# Patient Record
Sex: MALE | URBAN - METROPOLITAN AREA
[De-identification: names, ages, dates, MRNs, and addresses within clinical notes are randomized per-mention and may not be internally consistent; named-entity substitution may affect disease eponyms.]

---

## 2020-01-18 ENCOUNTER — HOSPITAL ENCOUNTER (INPATIENT)
Facility: MEDICAL CENTER | Age: 25
LOS: 2 days | DRG: 816 | End: 2020-01-20
Attending: EMERGENCY MEDICINE | Admitting: SURGERY
Payer: COMMERCIAL

## 2020-01-18 ENCOUNTER — APPOINTMENT (OUTPATIENT)
Dept: RADIOLOGY | Facility: MEDICAL CENTER | Age: 25
DRG: 816 | End: 2020-01-18
Attending: EMERGENCY MEDICINE
Payer: COMMERCIAL

## 2020-01-18 DIAGNOSIS — S36.029A SPLEEN HEMATOMA, INITIAL ENCOUNTER: ICD-10-CM
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
## 2020-01-18 PROBLEM — T14.90XA TRAUMA: Status: ACTIVE | Noted: 2020-01-18

## 2020-01-18 PROBLEM — Z53.09 CONTRAINDICATION TO DEEP VEIN THROMBOSIS (DVT) PROPHYLAXIS: Status: ACTIVE | Noted: 2020-01-18

## 2020-01-18 PROBLEM — S36.039A SPLEEN LACERATION: Status: ACTIVE | Noted: 2020-01-18

## 2020-01-18 PROBLEM — N28.1 RENAL CYST: Status: ACTIVE | Noted: 2020-01-18

## 2020-01-18 LAB
ABO GROUP BLD: NORMAL
ALBUMIN SERPL BCP-MCNC: 5.2 G/DL (ref 3.2–4.9)
ALBUMIN/GLOB SERPL: 1.5 G/DL
ALP SERPL-CCNC: 63 U/L (ref 30–99)
ALT SERPL-CCNC: 23 U/L (ref 2–50)
ANION GAP SERPL CALC-SCNC: 8 MMOL/L (ref 0–11.9)
APTT PPP: 27.2 SEC (ref 24.7–36)
AST SERPL-CCNC: 27 U/L (ref 12–45)
BILIRUB SERPL-MCNC: 1.5 MG/DL (ref 0.1–1.5)
BLD GP AB SCN SERPL QL: NORMAL
BUN SERPL-MCNC: 16 MG/DL (ref 8–22)
CALCIUM SERPL-MCNC: 10.2 MG/DL (ref 8.5–10.5)
CHLORIDE SERPL-SCNC: 105 MMOL/L (ref 96–112)
CO2 SERPL-SCNC: 25 MMOL/L (ref 20–33)
CREAT SERPL-MCNC: 1.17 MG/DL (ref 0.5–1.4)
ERYTHROCYTE [DISTWIDTH] IN BLOOD BY AUTOMATED COUNT: 35.6 FL (ref 35.9–50)
GLOBULIN SER CALC-MCNC: 3.4 G/DL (ref 1.9–3.5)
GLUCOSE SERPL-MCNC: 114 MG/DL (ref 65–99)
HCT VFR BLD AUTO: 41.5 % (ref 42–52)
HGB BLD-MCNC: 12.6 G/DL (ref 14–18)
HGB BLD-MCNC: 14 G/DL (ref 14–18)
INR PPP: 1.04 (ref 0.87–1.13)
LIPASE SERPL-CCNC: 15 U/L (ref 11–82)
MCH RBC QN AUTO: 28.6 PG (ref 27–33)
MCHC RBC AUTO-ENTMCNC: 33.7 G/DL (ref 33.7–35.3)
MCV RBC AUTO: 84.9 FL (ref 81.4–97.8)
PLATELET # BLD AUTO: 140 K/UL (ref 164–446)
PMV BLD AUTO: 10.9 FL (ref 9–12.9)
POTASSIUM SERPL-SCNC: 3.9 MMOL/L (ref 3.6–5.5)
PROT SERPL-MCNC: 8.6 G/DL (ref 6–8.2)
PROTHROMBIN TIME: 13.8 SEC (ref 12–14.6)
RBC # BLD AUTO: 4.89 M/UL (ref 4.7–6.1)
RH BLD: NORMAL
SODIUM SERPL-SCNC: 138 MMOL/L (ref 135–145)
WBC # BLD AUTO: 14.1 K/UL (ref 4.8–10.8)

## 2020-01-18 PROCEDURE — 86901 BLOOD TYPING SEROLOGIC RH(D): CPT

## 2020-01-18 PROCEDURE — 80053 COMPREHEN METABOLIC PANEL: CPT

## 2020-01-18 PROCEDURE — 86900 BLOOD TYPING SEROLOGIC ABO: CPT

## 2020-01-18 PROCEDURE — 83690 ASSAY OF LIPASE: CPT

## 2020-01-18 PROCEDURE — 85610 PROTHROMBIN TIME: CPT

## 2020-01-18 PROCEDURE — 99291 CRITICAL CARE FIRST HOUR: CPT

## 2020-01-18 PROCEDURE — 74177 CT ABD & PELVIS W/CONTRAST: CPT

## 2020-01-18 PROCEDURE — 86850 RBC ANTIBODY SCREEN: CPT

## 2020-01-18 PROCEDURE — 700105 HCHG RX REV CODE 258: Performed by: SURGERY

## 2020-01-18 PROCEDURE — 85730 THROMBOPLASTIN TIME PARTIAL: CPT

## 2020-01-18 PROCEDURE — 96374 THER/PROPH/DIAG INJ IV PUSH: CPT

## 2020-01-18 PROCEDURE — G0390 TRAUMA RESPONS W/HOSP CRITI: HCPCS

## 2020-01-18 PROCEDURE — 85018 HEMOGLOBIN: CPT

## 2020-01-18 PROCEDURE — 770022 HCHG ROOM/CARE - ICU (200)

## 2020-01-18 PROCEDURE — 36415 COLL VENOUS BLD VENIPUNCTURE: CPT

## 2020-01-18 PROCEDURE — 85027 COMPLETE CBC AUTOMATED: CPT

## 2020-01-18 PROCEDURE — A9270 NON-COVERED ITEM OR SERVICE: HCPCS | Performed by: NURSE PRACTITIONER

## 2020-01-18 PROCEDURE — 700111 HCHG RX REV CODE 636 W/ 250 OVERRIDE (IP): Performed by: NURSE PRACTITIONER

## 2020-01-18 PROCEDURE — 700117 HCHG RX CONTRAST REV CODE 255: Performed by: EMERGENCY MEDICINE

## 2020-01-18 PROCEDURE — 700102 HCHG RX REV CODE 250 W/ 637 OVERRIDE(OP): Performed by: NURSE PRACTITIONER

## 2020-01-18 RX ORDER — ONDANSETRON 2 MG/ML
4 INJECTION INTRAMUSCULAR; INTRAVENOUS EVERY 4 HOURS PRN
Status: DISCONTINUED | OUTPATIENT
Start: 2020-01-18 | End: 2020-01-20 | Stop reason: HOSPADM

## 2020-01-18 RX ORDER — OXYCODONE HYDROCHLORIDE 5 MG/1
5 TABLET ORAL
Status: DISCONTINUED | OUTPATIENT
Start: 2020-01-18 | End: 2020-01-20 | Stop reason: HOSPADM

## 2020-01-18 RX ORDER — ACETAMINOPHEN 650 MG/1
975 SUPPOSITORY RECTAL EVERY 6 HOURS
Status: DISCONTINUED | OUTPATIENT
Start: 2020-01-18 | End: 2020-01-18

## 2020-01-18 RX ORDER — DOCUSATE SODIUM 100 MG/1
100 CAPSULE, LIQUID FILLED ORAL 2 TIMES DAILY
Status: DISCONTINUED | OUTPATIENT
Start: 2020-01-18 | End: 2020-01-20 | Stop reason: HOSPADM

## 2020-01-18 RX ORDER — ACETAMINOPHEN 650 MG/1
650 SUPPOSITORY RECTAL EVERY 4 HOURS PRN
Status: DISCONTINUED | OUTPATIENT
Start: 2020-01-18 | End: 2020-01-20 | Stop reason: HOSPADM

## 2020-01-18 RX ORDER — POLYETHYLENE GLYCOL 3350 17 G/17G
1 POWDER, FOR SOLUTION ORAL 2 TIMES DAILY
Status: DISCONTINUED | OUTPATIENT
Start: 2020-01-18 | End: 2020-01-20 | Stop reason: HOSPADM

## 2020-01-18 RX ORDER — ENEMA 19; 7 G/133ML; G/133ML
1 ENEMA RECTAL
Status: DISCONTINUED | OUTPATIENT
Start: 2020-01-18 | End: 2020-01-20 | Stop reason: HOSPADM

## 2020-01-18 RX ORDER — BISACODYL 10 MG
10 SUPPOSITORY, RECTAL RECTAL
Status: DISCONTINUED | OUTPATIENT
Start: 2020-01-18 | End: 2020-01-20 | Stop reason: HOSPADM

## 2020-01-18 RX ORDER — AMOXICILLIN 250 MG
1 CAPSULE ORAL
Status: DISCONTINUED | OUTPATIENT
Start: 2020-01-18 | End: 2020-01-20 | Stop reason: HOSPADM

## 2020-01-18 RX ORDER — MORPHINE SULFATE 4 MG/ML
1-4 INJECTION, SOLUTION INTRAMUSCULAR; INTRAVENOUS
Status: DISCONTINUED | OUTPATIENT
Start: 2020-01-18 | End: 2020-01-20 | Stop reason: HOSPADM

## 2020-01-18 RX ORDER — FAMOTIDINE 20 MG/1
20 TABLET, FILM COATED ORAL 2 TIMES DAILY
Status: DISCONTINUED | OUTPATIENT
Start: 2020-01-18 | End: 2020-01-18

## 2020-01-18 RX ORDER — AMOXICILLIN 250 MG
1 CAPSULE ORAL NIGHTLY
Status: DISCONTINUED | OUTPATIENT
Start: 2020-01-18 | End: 2020-01-20 | Stop reason: HOSPADM

## 2020-01-18 RX ORDER — SODIUM CHLORIDE 9 MG/ML
INJECTION, SOLUTION INTRAVENOUS CONTINUOUS
Status: DISCONTINUED | OUTPATIENT
Start: 2020-01-18 | End: 2020-01-20

## 2020-01-18 RX ORDER — OXYCODONE HYDROCHLORIDE 10 MG/1
10 TABLET ORAL
Status: DISCONTINUED | OUTPATIENT
Start: 2020-01-18 | End: 2020-01-20 | Stop reason: HOSPADM

## 2020-01-18 RX ORDER — MORPHINE SULFATE 4 MG/ML
4 INJECTION, SOLUTION INTRAMUSCULAR; INTRAVENOUS
Status: DISCONTINUED | OUTPATIENT
Start: 2020-01-18 | End: 2020-01-18

## 2020-01-18 RX ORDER — ACETAMINOPHEN 325 MG/1
650 TABLET ORAL EVERY 4 HOURS PRN
Status: DISCONTINUED | OUTPATIENT
Start: 2020-01-18 | End: 2020-01-20 | Stop reason: HOSPADM

## 2020-01-18 RX ADMIN — FENTANYL CITRATE 50 MCG: 50 INJECTION, SOLUTION INTRAMUSCULAR; INTRAVENOUS at 18:02

## 2020-01-18 RX ADMIN — IOHEXOL 90 ML: 350 INJECTION, SOLUTION INTRAVENOUS at 16:57

## 2020-01-18 RX ADMIN — SODIUM CHLORIDE: 9 INJECTION, SOLUTION INTRAVENOUS at 19:07

## 2020-01-18 RX ADMIN — OXYCODONE HYDROCHLORIDE 5 MG: 5 TABLET ORAL at 20:17

## 2020-01-18 RX ADMIN — ACETAMINOPHEN 650 MG: 325 TABLET, FILM COATED ORAL at 19:06

## 2020-01-18 ASSESSMENT — LIFESTYLE VARIABLES
TOTAL SCORE: 1
TOTAL SCORE: 1
EVER_SMOKED: NEVER
CONSUMPTION TOTAL: NEGATIVE
ALCOHOL_USE: YES
HAVE YOU EVER FELT YOU SHOULD CUT DOWN ON YOUR DRINKING: YES
EVER_SMOKED: NEVER
TOTAL SCORE: 1
EVER FELT BAD OR GUILTY ABOUT YOUR DRINKING: NO
HAVE PEOPLE ANNOYED YOU BY CRITICIZING YOUR DRINKING: NO
DOES PATIENT WANT TO STOP DRINKING: NO
EVER HAD A DRINK FIRST THING IN THE MORNING TO STEADY YOUR NERVES TO GET RID OF A HANGOVER: NO
ON A TYPICAL DAY WHEN YOU DRINK ALCOHOL HOW MANY DRINKS DO YOU HAVE: 3
HOW MANY TIMES IN THE PAST YEAR HAVE YOU HAD 5 OR MORE DRINKS IN A DAY: 0
AVERAGE NUMBER OF DAYS PER WEEK YOU HAVE A DRINK CONTAINING ALCOHOL: 3

## 2020-01-18 ASSESSMENT — COPD QUESTIONNAIRES
DURING THE PAST 4 WEEKS HOW MUCH DID YOU FEEL SHORT OF BREATH: NONE/LITTLE OF THE TIME
HAVE YOU SMOKED AT LEAST 100 CIGARETTES IN YOUR ENTIRE LIFE: NO/DON'T KNOW
DO YOU EVER COUGH UP ANY MUCUS OR PHLEGM?: NO/ONLY WITH OCCASIONAL COLDS OR INFECTIONS
DO YOU EVER COUGH UP ANY MUCUS OR PHLEGM?: NO/ONLY WITH OCCASIONAL COLDS OR INFECTIONS
COPD SCREENING SCORE: 0
HAVE YOU SMOKED AT LEAST 100 CIGARETTES IN YOUR ENTIRE LIFE: NO/DON'T KNOW
IN THE PAST 12 MONTHS DO YOU DO LESS THAN YOU USED TO BECAUSE OF YOUR BREATHING PROBLEMS: DISAGREE/UNSURE
DURING THE PAST 4 WEEKS HOW MUCH DID YOU FEEL SHORT OF BREATH: NONE/LITTLE OF THE TIME

## 2020-01-18 ASSESSMENT — PATIENT HEALTH QUESTIONNAIRE - PHQ9
SUM OF ALL RESPONSES TO PHQ9 QUESTIONS 1 AND 2: 0
2. FEELING DOWN, DEPRESSED, IRRITABLE, OR HOPELESS: NOT AT ALL
1. LITTLE INTEREST OR PLEASURE IN DOING THINGS: NOT AT ALL

## 2020-01-19 LAB
ABO + RH BLD: NORMAL
ALBUMIN SERPL BCP-MCNC: 4 G/DL (ref 3.2–4.9)
ALBUMIN/GLOB SERPL: 1.4 G/DL
ALP SERPL-CCNC: 51 U/L (ref 30–99)
ALT SERPL-CCNC: 15 U/L (ref 2–50)
ANION GAP SERPL CALC-SCNC: 8 MMOL/L (ref 0–11.9)
AST SERPL-CCNC: 28 U/L (ref 12–45)
BASOPHILS # BLD AUTO: 0.2 % (ref 0–1.8)
BASOPHILS # BLD: 0.02 K/UL (ref 0–0.12)
BILIRUB SERPL-MCNC: 1.3 MG/DL (ref 0.1–1.5)
BUN SERPL-MCNC: 11 MG/DL (ref 8–22)
CALCIUM SERPL-MCNC: 9.2 MG/DL (ref 8.5–10.5)
CHLORIDE SERPL-SCNC: 107 MMOL/L (ref 96–112)
CO2 SERPL-SCNC: 25 MMOL/L (ref 20–33)
CREAT SERPL-MCNC: 0.83 MG/DL (ref 0.5–1.4)
EOSINOPHIL # BLD AUTO: 0.06 K/UL (ref 0–0.51)
EOSINOPHIL NFR BLD: 0.6 % (ref 0–6.9)
ERYTHROCYTE [DISTWIDTH] IN BLOOD BY AUTOMATED COUNT: 35.8 FL (ref 35.9–50)
GLOBULIN SER CALC-MCNC: 2.9 G/DL (ref 1.9–3.5)
GLUCOSE BLD-MCNC: 91 MG/DL (ref 65–99)
GLUCOSE SERPL-MCNC: 96 MG/DL (ref 65–99)
HCT VFR BLD AUTO: 36.4 % (ref 42–52)
HCT VFR BLD AUTO: 38.2 % (ref 42–52)
HCT VFR BLD AUTO: 38.4 % (ref 42–52)
HGB BLD-MCNC: 12.6 G/DL (ref 14–18)
HGB BLD-MCNC: 13 G/DL (ref 14–18)
HGB BLD-MCNC: 13.1 G/DL (ref 14–18)
IMM GRANULOCYTES # BLD AUTO: 0.02 K/UL (ref 0–0.11)
IMM GRANULOCYTES NFR BLD AUTO: 0.2 % (ref 0–0.9)
LYMPHOCYTES # BLD AUTO: 1.17 K/UL (ref 1–4.8)
LYMPHOCYTES NFR BLD: 12.6 % (ref 22–41)
MCH RBC QN AUTO: 29 PG (ref 27–33)
MCHC RBC AUTO-ENTMCNC: 34.6 G/DL (ref 33.7–35.3)
MCV RBC AUTO: 83.9 FL (ref 81.4–97.8)
MONOCYTES # BLD AUTO: 0.61 K/UL (ref 0–0.85)
MONOCYTES NFR BLD AUTO: 6.6 % (ref 0–13.4)
NEUTROPHILS # BLD AUTO: 7.43 K/UL (ref 1.82–7.42)
NEUTROPHILS NFR BLD: 79.8 % (ref 44–72)
NRBC # BLD AUTO: 0 K/UL
NRBC BLD-RTO: 0 /100 WBC
PLATELET # BLD AUTO: 151 K/UL (ref 164–446)
PMV BLD AUTO: 10.6 FL (ref 9–12.9)
POTASSIUM SERPL-SCNC: 3.4 MMOL/L (ref 3.6–5.5)
PROT SERPL-MCNC: 6.9 G/DL (ref 6–8.2)
RBC # BLD AUTO: 4.34 M/UL (ref 4.7–6.1)
SODIUM SERPL-SCNC: 140 MMOL/L (ref 135–145)
WBC # BLD AUTO: 9.3 K/UL (ref 4.8–10.8)

## 2020-01-19 PROCEDURE — A9270 NON-COVERED ITEM OR SERVICE: HCPCS | Performed by: NURSE PRACTITIONER

## 2020-01-19 PROCEDURE — 90686 IIV4 VACC NO PRSV 0.5 ML IM: CPT | Performed by: NURSE PRACTITIONER

## 2020-01-19 PROCEDURE — 85018 HEMOGLOBIN: CPT

## 2020-01-19 PROCEDURE — 770022 HCHG ROOM/CARE - ICU (200)

## 2020-01-19 PROCEDURE — 700102 HCHG RX REV CODE 250 W/ 637 OVERRIDE(OP): Performed by: SURGERY

## 2020-01-19 PROCEDURE — 85025 COMPLETE CBC W/AUTO DIFF WBC: CPT

## 2020-01-19 PROCEDURE — 700102 HCHG RX REV CODE 250 W/ 637 OVERRIDE(OP): Performed by: NURSE PRACTITIONER

## 2020-01-19 PROCEDURE — 700112 HCHG RX REV CODE 229: Performed by: NURSE PRACTITIONER

## 2020-01-19 PROCEDURE — 82962 GLUCOSE BLOOD TEST: CPT

## 2020-01-19 PROCEDURE — 99233 SBSQ HOSP IP/OBS HIGH 50: CPT | Performed by: SURGERY

## 2020-01-19 PROCEDURE — 700111 HCHG RX REV CODE 636 W/ 250 OVERRIDE (IP): Performed by: NURSE PRACTITIONER

## 2020-01-19 PROCEDURE — 85014 HEMATOCRIT: CPT

## 2020-01-19 PROCEDURE — 700105 HCHG RX REV CODE 258: Performed by: SURGERY

## 2020-01-19 PROCEDURE — 90471 IMMUNIZATION ADMIN: CPT

## 2020-01-19 PROCEDURE — 3E02340 INTRODUCTION OF INFLUENZA VACCINE INTO MUSCLE, PERCUTANEOUS APPROACH: ICD-10-PCS | Performed by: NURSE PRACTITIONER

## 2020-01-19 PROCEDURE — 80053 COMPREHEN METABOLIC PANEL: CPT

## 2020-01-19 PROCEDURE — A9270 NON-COVERED ITEM OR SERVICE: HCPCS | Performed by: SURGERY

## 2020-01-19 RX ORDER — POTASSIUM CHLORIDE 20 MEQ/1
40 TABLET, EXTENDED RELEASE ORAL ONCE
Status: COMPLETED | OUTPATIENT
Start: 2020-01-19 | End: 2020-01-19

## 2020-01-19 RX ADMIN — ACETAMINOPHEN 650 MG: 325 TABLET, FILM COATED ORAL at 15:04

## 2020-01-19 RX ADMIN — SODIUM CHLORIDE: 9 INJECTION, SOLUTION INTRAVENOUS at 05:07

## 2020-01-19 RX ADMIN — OXYCODONE HYDROCHLORIDE 10 MG: 10 TABLET ORAL at 19:58

## 2020-01-19 RX ADMIN — POTASSIUM CHLORIDE 40 MEQ: 1500 TABLET, EXTENDED RELEASE ORAL at 10:51

## 2020-01-19 RX ADMIN — ACETAMINOPHEN 650 MG: 325 TABLET, FILM COATED ORAL at 05:04

## 2020-01-19 RX ADMIN — SODIUM CHLORIDE: 9 INJECTION, SOLUTION INTRAVENOUS at 15:58

## 2020-01-19 RX ADMIN — ONDANSETRON 4 MG: 2 INJECTION INTRAMUSCULAR; INTRAVENOUS at 19:58

## 2020-01-19 RX ADMIN — INFLUENZA A VIRUS A/BRISBANE/02/2018 IVR-190 (H1N1) ANTIGEN (FORMALDEHYDE INACTIVATED), INFLUENZA A VIRUS A/KANSAS/14/2017 X-327 (H3N2) ANTIGEN (FORMALDEHYDE INACTIVATED), INFLUENZA B VIRUS B/PHUKET/3073/2013 ANTIGEN (FORMALDEHYDE INACTIVATED), AND INFLUENZA B VIRUS B/MARYLAND/15/2016 BX-69A ANTIGEN (FORMALDEHYDE INACTIVATED) 0.5 ML: 15; 15; 15; 15 INJECTION, SUSPENSION INTRAMUSCULAR at 10:51

## 2020-01-19 RX ADMIN — DOCUSATE SODIUM 100 MG: 100 CAPSULE, LIQUID FILLED ORAL at 05:04

## 2020-01-19 ASSESSMENT — COGNITIVE AND FUNCTIONAL STATUS - GENERAL
SUGGESTED CMS G CODE MODIFIER MOBILITY: CJ
MOBILITY SCORE: 21
SUGGESTED CMS G CODE MODIFIER DAILY ACTIVITY: CH
DAILY ACTIVITIY SCORE: 24
STANDING UP FROM CHAIR USING ARMS: A LITTLE
WALKING IN HOSPITAL ROOM: A LITTLE
CLIMB 3 TO 5 STEPS WITH RAILING: A LITTLE

## 2020-01-19 ASSESSMENT — ENCOUNTER SYMPTOMS
ROS GI COMMENTS: IMPROVING
ABDOMINAL DISTENTION: 1

## 2020-01-19 NOTE — H&P
TRAUMA HISTORY AND PHYSICAL    DATE OF SERVICE: 1/18/2020    ACTIVATION LEVEL: Green.     HISTORY OF PRESENT ILLNESS: The patient is a  24 year-old male who was injured after crashing while snowboarding. He did not sustain a loss of consciousness.  He was able to stand after the crash, but developed abdominal pain which prompted him to be evaluated in the ER.    The patient was triaged to [facility] [place] in accordance with established pre hospital protocols. An expeditious primary and secondary survey with required adjuncts was conducted. See Trauma Narrator for full details.    Upon my arrival, the patient is awake and complains of minimal abdominal pain.    PAST MEDICAL HISTORY: History reviewed. No pertinent past medical history.      PAST SURGICAL HISTORY: History reviewed. No pertinent surgical history.       ALLERGIES: Patient has no known allergies.       CURRENT MEDICATIONS:   Outpatient Medications Marked as Taking for the 1/18/20 encounter (Hospital Encounter)   Medication Sig   • Vitamin D-Vitamin K (VITAMIN K2-VITAMIN D3 PO) Take 1 Tab by mouth every morning.         FAMILY HISTORY:   Reviewed and found to be non-contributory in regards to the above presentation    SOCIAL HISTORY:  reports that he has never smoked. He has never used smokeless tobacco. He reports previous alcohol use. He reports previous drug use.  Patient denies habitual use of alcohol, tobacco, and illicit drugs    REVIEW OF SYSTEMS:   Review of Systems:  Constitutional: Negative for fever, chills, weight loss, malaise/fatigue and diaphoresis.   HENT: Negative for hearing loss, ear pain, nosebleeds, congestion, sore throat, neck pain, tinnitus and ear discharge.    Eyes: Negative for blurred vision, double vision, photophobia, pain, discharge and redness.   Respiratory: Negative for cough, hemoptysis, sputum production, shortness of breath, wheezing and stridor.    Cardiovascular: Negative for chest pain, palpitations,  "orthopnea, claudication, leg swelling and PND.   Gastrointestinal: Negative for heartburn, nausea, vomiting, abdominal pain, diarrhea, constipation, blood in stool and melena.   Genitourinary: Negative for dysuria, urgency, frequency, hematuria and flank pain.   Musculoskeletal: Negative for myalgias, back pain, joint pain and falls.   Skin: Negative for itching and rash.  Neurological: Negative for dizziness, tingling, tremors, sensory change, speech change, focal weakness, seizures, loss of consciousness, weakness and headaches.   Endo/Heme/Allergies: Negative for environmental allergies and polydipsia. Does not bruise/bleed easily.   Psychiatric/Behavioral: Negative for depression, suicidal ideas, hallucinations, memory loss and substance abuse. The patient is not nervous/anxious and does not have insomnia.    PHYSICAL EXAMINATION:   VITAL SIGNS:   · /78   Pulse 97   Temp 36.8 °C (98.2 °F) (Temporal)   Resp 18   Ht 1.803 m (5' 11\")   Wt 88.5 kg (195 lb)   SpO2 97%     GENERAL:   · The patient appears stated age, is in no apparent distress, is well developed and well nourished    HEENT:  · HEAD: Atraumatic, normocephalic.    · EARS: The ear canals and tympanic membranes are normal.Normal pinna bilaterally.    · EYES:  The pupils are equal, round, and reactive to light bilaterally. The extraocular muscles are intact bilaterally.    · NOSE: No rhinorrhea.  The bilateral nares are clear.  · THROAT: Oral mucosa is moist.  The oropharynx are clear.    FACE:   · The midface and jaw are stable. No malocclusion is evident.    NECK:    · The cervical spine was examined utilizing spinal motion restriction.   · No posterior midline cervical-spine tenderness, no evidence of intoxication, normal level of alertness (John Coma Scale 15), no focal neurologic deficit, and no painful distracting injuries..    CHEST:    · Inspection: Unlabored respirations, no intercostal retractions, paradoxical motion, or accessory " muscle use.  · Palpation:  The chest is nontender. The clavicles are non deformed bilaterally..  · Auscultation: clear to auscultation.    CARDIOVASCULAR:    · Auscultation: regular rate and rhythm.  · Peripheral Pulses: Normal.      ABDOMEN:    · Abdomen is soft, nontender, without organomegaly or masses.    BACK/PELVIS:    · The thoracolumbar spine was examined utilizing spinal motion restriction.   · Inspection and palpation reveal no significant tenderness, swelling, or deformity in the thoracolumbar region.  · The pelvis is stable.    RECTAL:  Deferred    GENITOURINARY:  The patient has normal external reproductive anatomy.    EXTREMITIES:  · RIGHT ARM: Without deformities, wounds, lacerations, or excoriations.  Full passive and active range of motion without pain.  · LEFT ARM: Without deformities, wounds, lacerations, or excoriations.  Full passive and active range of motion without pain.  · RIGHT LEG: Without deformities, wounds, lacerations, or excoriations.  Full passive and active range of motion without pain.  · LEFT LEG: Without deformities, wounds, lacerations, or excoriations.  Full passive and active range of motion without pain.    NEUROLOGIC:    · [place] Coma Scale (GCS) 15.   · Neurologic examination revealed no focal deficits noted, mental status intact, muscle tone normal, muscle strength normal, oriented for age x3.    SKIN:  · The skin is warm, dry and well purfused.    PSYCHIATRIC:   · The patient does not appear depressed or anxious.    LABORATORY VALUES:   Recent Labs     01/18/20  1626   WBC 14.1*   RBC 4.89   HEMOGLOBIN 14.0   HEMATOCRIT 41.5*   MCV 84.9   MCH 28.6   MCHC 33.7   RDW 35.6*   PLATELETCT 140*   MPV 10.9     Recent Labs     01/18/20  1626   SODIUM 138   POTASSIUM 3.9   CHLORIDE 105   CO2 25   GLUCOSE 114*   BUN 16   CREATININE 1.17   CALCIUM 10.2     Recent Labs     01/18/20  1626   ASTSGOT 27   ALTSGPT 23   TBILIRUBIN 1.5   ALKPHOSPHAT 63   GLOBULIN 3.4   INR 1.04     Recent  Labs     01/18/20  1626   APTT 27.2   INR 1.04        IMAGING:   CT-CHEST,ABDOMEN,PELVIS WITH   Final Result      1.  Moderate sized perisplenic hematoma      2.  Presence of a perisplenic hematoma involving greater than 50% of the surface is a grade 3 splenic injury      3.  No other acute finding      4.  Incidental left renal cyst      Findings were discussed with Dr. Pan on 1/18/2020 5:10 PM.      No follow up imaging is recommended per consensus guidelines of the 2019 ACR Incidental Findings Committee for probably benign incidental simple appearing renal cystic lesion(s) based on imaging criteria.          IMPRESSION AND PLAN:   Spleen laceration  Moderate sized perisplenic hematoma  Perisplenic hematoma involving greater than 50% of the surface is a grade 3 splenic injury.  Bedrest times 24 hours  Serial hemogram and abdominal exams    Renal cyst  Incidental findings.  Follow up with PCP outpatient    Trauma  Snowboarding crash. (-) LOC  Trauma Green Activation.  Anderson Romero MD. Trauma Surgery.    Contraindication to deep vein thrombosis (DVT) prophylaxis  Initial systemic anticoagulation contraindicated secondary to elevated bleeding risk.   Consider surveillance venous duplex scanning if unable to initiate prophylactic Lovenox within 48 hrs of admission.      DISPOSITION:  Trauma ICU.    I independently reviewed pertinent clinical lab tests since arrival and ordered additional follow up clinical lab tests.  I independently reviewed pertinent radiographic images and the radiologist's reports since arrival and ordered additional follow up radiographic studies.  I reviewed the details of the available patient records and summated the information, and utilized the information as warranted in today's medical decision making for this patient.    High grade blunt splenic injury with risk of life threatening hemorrhage requiring integration of multiple laboratory, radiographic, and hemodynamic data points and  associated complex medical decision making involving high complexity decision making initiated in an urgent manner by assessing, manipulating, and supporting circulatory function and cardiac function given the high probability of further deterioration in the patient's condition and threat to life.    Aggregated critical care time spent evaluating, reviewing documentation, providing care, and managing this patient exclusive of procedures: 45 minutes  ____________________________________   LUIS DANIEL Feliciano     DD: 1/18/2020   DT: 7:28 PM

## 2020-01-19 NOTE — PROGRESS NOTES
"TRAUMA TERTIARY SURVEY     Mental status adequate for full examination?: Yes    Spine cleared (radiologically and/or clinically): Yes    PHYSICAL EXAMINATION:  Vitals: /66   Pulse 70   Temp 37.1 °C (98.8 °F) (Temporal)   Resp 17   Ht 1.803 m (5' 11\")   Wt 91.3 kg (201 lb 4.5 oz)   SpO2 98%   BMI 28.07 kg/m²   Constitutional:     General Appearance: appears stated age.  HEENT:     No significant external craniofacial trauma. The pupils are equal, round, and reactive to light bilaterally. The extraocular muscles are intact bilaterally. The nares and oropharynx are clear. The midface and jaw are stable. No malocclusion is evident.  Neck:    Normal range of motion.  Respiratory:   Inspection: Unlabored respirations, no intercostal retractions, paradoxical motion, or accessory muscle use.   Palpation:  The chest is nontender. The clavicles are non deformed bilaterally..   Auscultation: clear to auscultation.  Cardiovascular:   Auscultation: normal.   Peripheral Pulses: Normal.   Abdomen:   Abdomen tender with palpation. L>R upper quadrants and epigastric region  Genitourinary:   (MALE): Not observed   Musculoskeletal:   The pelvis is stable.  No significant angulation, deformity, or soft tissue injury involving the upper and lower extremities. Normal range of motion.   Back:   The thoracolumbar spine was examined. Examination is remarkable for no significant tenderness, swelling, or deformity in the thoracolumbar region.  Skin:   The skin is warm and dry.  Neurologic:    John Coma Scale (GCS) 15 [id number]. Neurologic examination revealed oriented for age x3.  Psychiatric:   The patient does not appear depressed or anxious.    IMAGING:  CT-CHEST,ABDOMEN,PELVIS WITH   Final Result      1.  Moderate sized perisplenic hematoma      2.  Presence of a perisplenic hematoma involving greater than 50% of the surface is a grade 3 splenic injury      3.  No other acute finding      4.  Incidental left renal cyst    "   Findings were discussed with Dr. Pan on 1/18/2020 5:10 PM.      No follow up imaging is recommended per consensus guidelines of the 2019 ACR Incidental Findings Committee for probably benign incidental simple appearing renal cystic lesion(s) based on imaging criteria.        All current laboratory studies/radiology exams reviewed: Yes    Completed Consultations:  None     Pending Consultations:  None    Newly Identified Diagnoses and Injuries:  Non3    TOTAL RAP SCORE:  RAP Score Total: 2      ETOH Screening  CAGE Score: 1  Assessment complete date: 1/19/2020

## 2020-01-19 NOTE — ED NOTES
Pharmacy Medication Reconciliation      Medication reconciliation updated and complete per pt at bedside  Allergies have been verified   No oral ABX within the last 14 days

## 2020-01-19 NOTE — ED NOTES
Trauma green: pt bib ems, snowboarding fell, denies loc. C/o mid abdominal pain. Pt was not wearing helmet. gcs=15

## 2020-01-19 NOTE — CARE PLAN
Problem: Infection  Goal: Will remain free from infection  Outcome: PROGRESSING AS EXPECTED  Intervention: Assess signs and symptoms of infection  Note:   Interventions in place. Education on infection prevention provided. Lines assessed for necessity     Problem: Venous Thromboembolism (VTW)/Deep Vein Thrombosis (DVT) Prevention:  Goal: Patient will participate in Venous Thrombosis (VTE)/Deep Vein Thrombosis (DVT)Prevention Measures  Outcome: PROGRESSING AS EXPECTED  Intervention: Ensure patient wears graduated elastic stockings (HENRI hose) and/or SCDs, if ordered, when in bed or chair (Remove at least once per shift for skin check)  Note:   SCD's on lower legs. Pt educated on need for use of SCD's. No anticoagulation at this time per MD

## 2020-01-19 NOTE — PROGRESS NOTES
1900 Pt arrival to unit, S 124. Pt self transferred to ICU bed. CHG bath provided. Pt attached to in room monitor. 2 RN skin assessment performed with Quinn HUSAIN. Pts skin appears intact. Full assessment performed by this RN. Pt AxOx4, complains of pain to abdomen. Provided with call light. Bed locked and alarm on.

## 2020-01-19 NOTE — ASSESSMENT & PLAN NOTE
Initial systemic anticoagulation contraindicated secondary to elevated bleeding risk.   Consider surveillance venous duplex scanning if unable to initiate prophylactic Lovenox within 48 hrs of admission.

## 2020-01-19 NOTE — ED PROVIDER NOTES
ED Provider Note    CHIEF COMPLAINT  Chief Complaint   Patient presents with   • Trauma Green       [facility]  Isela Tito is a 24 y.o. male who presents to the emergency department complaining of abdominal pain after snowboarding accident.  Patient was snowboarding downhill and caught his toe side edge, he then tumbled multiple times.  Did not have any initial injury.  Later said having some abdominal pain and feeling lightheaded.  She went to the clinic and work-up was felt to be pale and ill-appearing.  EMS was called he was sent here.  Diverted from a local hospital.  Planes of diffuse abdominal pain and some chest discomfort he takes a deep breath.  Denies any nausea vomiting other acute medical problems or complaints.  Was not wearing helmet did not hit his head.  No headache or neck pain.  No musculoskeletal injury to his arms or legs.  No back pain.    REVIEW OF SYSTEMS  See HPI for further details. All other systems are negative.    PAST MEDICAL HISTORY  History reviewed. No pertinent past medical history.    FAMILY HISTORY  History reviewed. No pertinent family history.    SOCIAL HISTORY  Social History     Socioeconomic History   • Marital status: Not on file     Spouse name: Not on file   • Number of children: Not on file   • Years of education: Not on file   • Highest education level: Not on file   Occupational History   • Not on file   Social Needs   • Financial resource strain: Not on file   • Food insecurity:     Worry: Not on file     Inability: Not on file   • Transportation needs:     Medical: Not on file     Non-medical: Not on file   Tobacco Use   • Smoking status: Never Smoker   • Smokeless tobacco: Never Used   Substance and Sexual Activity   • Alcohol use: Not Currently   • Drug use: Not Currently   • Sexual activity: Not on file   Lifestyle   • Physical activity:     Days per week: Not on file     Minutes per session: Not on file   • Stress: Not on file   Relationships   • Social connections:      "Talks on phone: Not on file     Gets together: Not on file     Attends Protestant service: Not on file     Active member of club or organization: Not on file     Attends meetings of clubs or organizations: Not on file     Relationship status: Not on file   • Intimate partner violence:     Fear of current or ex partner: Not on file     Emotionally abused: Not on file     Physically abused: Not on file     Forced sexual activity: Not on file   Other Topics Concern   • Not on file   Social History Narrative   • Not on file       SURGICAL HISTORY  History reviewed. No pertinent surgical history.    CURRENT MEDICATIONS  Home Medications     Reviewed by Carie Sanabria R.N. (Registered Nurse) on 01/18/20 at 1648  Med List Status: Complete   Medication Last Dose Status        Patient Arnulfo Taking any Medications                       ALLERGIES  No Known Allergies    PHYSICAL EXAM  VITAL SIGNS: /78   Pulse 97   Temp 36.8 °C (98.2 °F) (Temporal)   Resp 18   Ht 1.803 m (5' 11\")   Wt 88.5 kg (195 lb)   SpO2 97%   BMI 27.20 kg/m²    Constitutional: Well developed, Well nourished, No acute distress, Non-toxic appearance.   HENT: Normocephalic, Atraumatic, Bilateral external ears normal, Oropharynx moist, No oral exudates, Nose normal.   Eyes: PERRL, EOMI, Conjunctiva normal, No discharge.   Neck: Normal range of motion, No tenderness, Supple, No stridor.  Neck is clearable by Nexus criteria  Cardiovascular: Normal heart rate, Normal rhythm, No murmurs, No rubs,   Thorax & Lungs: Normal breath sounds, No respiratory distress, No wheezing, No chest tenderness.   Abdomen: Bowel sounds normal, Soft, diffusely tender without peritonitis most tender left upper quadrant.  skin: Warm, Dry, No erythema, No rash.   Back: No tenderness, No CVA tenderness.   Musculoskeletal: Good range of motion in all major joints.  Neurologic: Alert & oriented x 3, No focal deficits noted.   Psychiatric: Affect normal    Results for orders " placed or performed during the hospital encounter of 01/18/20   CBC WITHOUT DIFFERENTIAL   Result Value Ref Range    WBC 14.1 (H) 4.8 - 10.8 K/uL    RBC 4.89 4.70 - 6.10 M/uL    Hemoglobin 14.0 14.0 - 18.0 g/dL    Hematocrit 41.5 (L) 42.0 - 52.0 %    MCV 84.9 81.4 - 97.8 fL    MCH 28.6 27.0 - 33.0 pg    MCHC 33.7 33.7 - 35.3 g/dL    RDW 35.6 (L) 35.9 - 50.0 fL    Platelet Count 140 (L) 164 - 446 K/uL    MPV 10.9 9.0 - 12.9 fL   Comp Metabolic Panel   Result Value Ref Range    Sodium 138 135 - 145 mmol/L    Potassium 3.9 3.6 - 5.5 mmol/L    Chloride 105 96 - 112 mmol/L    Co2 25 20 - 33 mmol/L    Anion Gap 8.0 0.0 - 11.9    Glucose 114 (H) 65 - 99 mg/dL    Bun 16 8 - 22 mg/dL    Creatinine 1.17 0.50 - 1.40 mg/dL    Calcium 10.2 8.5 - 10.5 mg/dL    AST(SGOT) 27 12 - 45 U/L    ALT(SGPT) 23 2 - 50 U/L    Alkaline Phosphatase 63 30 - 99 U/L    Total Bilirubin 1.5 0.1 - 1.5 mg/dL    Albumin 5.2 (H) 3.2 - 4.9 g/dL    Total Protein 8.6 (H) 6.0 - 8.2 g/dL    Globulin 3.4 1.9 - 3.5 g/dL    A-G Ratio 1.5 g/dL   LIPASE   Result Value Ref Range    Lipase 15 11 - 82 U/L   APTT   Result Value Ref Range    APTT 27.2 24.7 - 36.0 sec   PROTHROMBIN TIME (INR)   Result Value Ref Range    PT 13.8 12.0 - 14.6 sec    INR 1.04 0.87 - 1.13   ESTIMATED GFR   Result Value Ref Range    GFR If African American >60 >60 mL/min/1.73 m 2    GFR If Non African American >60 >60 mL/min/1.73 m 2        RADIOLOGY/PROCEDURES  CT-CHEST,ABDOMEN,PELVIS WITH   Final Result      1.  Moderate sized perisplenic hematoma      2.  Presence of a perisplenic hematoma involving greater than 50% of the surface is a grade 3 splenic injury      3.  No other acute finding      4.  Incidental left renal cyst      Findings were discussed with Dr. Pan on 1/18/2020 5:10 PM.      No follow up imaging is recommended per consensus guidelines of the 2019 ACR Incidental Findings Committee for probably benign incidental simple appearing renal cystic lesion(s) based on imaging  criteria.            COURSE & MEDICAL DECISION MAKING  Pertinent Labs & Imaging studies reviewed. (See chart for details)    Patient presents transferred here via EMS for abdominal pain after trauma.  He fell but denied his head.  He did have a tumble.  He was not knocked out.  He said  Clinically clear.  No signs of head injury.  Chest is nontender wrist and chest.  Takes a deep breath.  Also some abdominal tenderness.  Patient an episode of lightheadedness and diaphoresis.    The patient require hospitalization for further work-up and treatment.  Case discussed with the trauma surgery service.  Patient has no other apparent injuries.  Other injury to his back or extremities.    Labs been obtained and reviewed.  The patient will be hospitalized with trauma surgery service.  Case discussed with Dr. Pan  and care transferred at that time      FINAL IMPRESSION  1. Spleen hematoma, initial encounter     2. Snowboard accident, initial encounter         2.   3.         Electronically signed by: Mendel Petty M.D., 1/18/2020 5:55 PM

## 2020-01-19 NOTE — PROGRESS NOTES
"Trauma Progress Note 1/19/2020 4:47 AM    This is a 24 y.o. male who crashed while snowboarding. He sustained grade 3 spleen laceration. His hemoglobin has been stable through the night.    Plan:   - continue hemoglobin monitoring  - start diet this AM   - continue bedrest until this evening     Assessment: awake, alert, pain is controlled    /68   Pulse 78   Temp 36.9 °C (98.4 °F) (Temporal)   Resp 19   Ht 1.803 m (5' 11\")   Wt 91.1 kg (200 lb 13.4 oz)   SpO2 98%   BMI 28.01 kg/m²     Hemoglobin: 12.6 g/dL  Hematocrit: 36.4 %    Urine Output: voiding / adequate output     Recent Labs     01/18/20  1626   APTT 27.2   INR 1.04      Spleen laceration- (present on admission)  Assessment & Plan  Moderate sized perisplenic hematoma  Perisplenic hematoma involving greater than 50% of the surface is a grade 3 splenic injury.  Bedrest times 24 hours  Serial hemogram and abdominal exams    Contraindication to deep vein thrombosis (DVT) prophylaxis- (present on admission)  Assessment & Plan  Initial systemic anticoagulation contraindicated secondary to elevated bleeding risk.   Consider surveillance venous duplex scanning if unable to initiate prophylactic Lovenox within 48 hrs of admission.    Trauma- (present on admission)  Assessment & Plan  Snowboarding crash. (-) LOC  Trauma Green Activation.  Anderson Romero MD. Trauma Surgery.    Renal cyst- (present on admission)  Assessment & Plan  Incidental findings.  Follow up with PCP outpatient        "

## 2020-01-19 NOTE — CARE PLAN
Problem: Knowledge Deficit  Goal: Knowledge of disease process/condition, treatment plan, diagnostic tests, and medications will improve  Note:   Assessment of knowledge of disease process and discussion regarding plan of care and goals of care.      Problem: Pain Management  Goal: Pain level will decrease to patient's comfort goal  Note:   Pharmacological and nonpharmacological interventions in place to help manage pain

## 2020-01-19 NOTE — PROGRESS NOTES
Trauma / Surgical Daily Progress Note    Date of Service  1/19/2020    Chief Complaint  24 y.o. male admitted 1/18/2020 with splenic laceration after a fall while snowboarding    Interval Events  Hospital day #2  Pt currently requires ICU care and hospital admission  Seen on rounds and discussed with multidisciplinary team  Physiologic derangements preclude floor transfer  Events and interventions include:  Integration of multiple data points and associated complex medical decisions   Pain control  Pulmonary toilet  Serial hgbs and monitoring of hemodynamic status    Review of Systems  Review of Systems   Gastrointestinal: Positive for abdominal distention.        Improving   All other systems reviewed and are negative.       Vital Signs for last 24 hours  Temp:  [36.7 °C (98 °F)-37.3 °C (99.1 °F)] 37.1 °C (98.8 °F)  Pulse:  [] 100  Resp:  [16-20] 20  BP: (101-135)/(55-83) 124/68  SpO2:  [94 %-100 %] 100 %    Hemodynamic parameters for last 24 hours       Respiratory Data     Respiration: 20, Pulse Oximetry: 100 %, O2 Daily Delivery Respiratory : Room Air with O2 Available        RUL Breath Sounds: Clear, RML Breath Sounds: Clear, RLL Breath Sounds: Clear, MEGAN Breath Sounds: Clear, LLL Breath Sounds: Clear    Physical Exam  Physical Exam  Constitutional:       General: He is not in acute distress.     Appearance: He is not ill-appearing.   HENT:      Head: Normocephalic and atraumatic.      Right Ear: External ear normal.      Left Ear: External ear normal.      Mouth/Throat:      Mouth: Mucous membranes are moist.   Eyes:      Extraocular Movements: Extraocular movements intact.      Pupils: Pupils are equal, round, and reactive to light.   Neck:      Musculoskeletal: Normal range of motion.   Cardiovascular:      Rate and Rhythm: Normal rate and regular rhythm.      Pulses: Normal pulses.      Heart sounds: Normal heart sounds.   Pulmonary:      Effort: Pulmonary effort is normal.      Breath sounds: Normal  breath sounds.   Abdominal:      General: Abdomen is flat.      Tenderness: There is no tenderness. There is no guarding.   Musculoskeletal: Normal range of motion.   Skin:     General: Skin is warm and dry.   Neurological:      General: No focal deficit present.      Mental Status: He is alert and oriented to person, place, and time.      Cranial Nerves: No cranial nerve deficit.   Psychiatric:         Mood and Affect: Mood normal.         Thought Content: Thought content normal.         Judgment: Judgment normal.         Laboratory  Recent Results (from the past 24 hour(s))   CBC WITHOUT DIFFERENTIAL    Collection Time: 01/18/20  4:26 PM   Result Value Ref Range    WBC 14.1 (H) 4.8 - 10.8 K/uL    RBC 4.89 4.70 - 6.10 M/uL    Hemoglobin 14.0 14.0 - 18.0 g/dL    Hematocrit 41.5 (L) 42.0 - 52.0 %    MCV 84.9 81.4 - 97.8 fL    MCH 28.6 27.0 - 33.0 pg    MCHC 33.7 33.7 - 35.3 g/dL    RDW 35.6 (L) 35.9 - 50.0 fL    Platelet Count 140 (L) 164 - 446 K/uL    MPV 10.9 9.0 - 12.9 fL   Comp Metabolic Panel    Collection Time: 01/18/20  4:26 PM   Result Value Ref Range    Sodium 138 135 - 145 mmol/L    Potassium 3.9 3.6 - 5.5 mmol/L    Chloride 105 96 - 112 mmol/L    Co2 25 20 - 33 mmol/L    Anion Gap 8.0 0.0 - 11.9    Glucose 114 (H) 65 - 99 mg/dL    Bun 16 8 - 22 mg/dL    Creatinine 1.17 0.50 - 1.40 mg/dL    Calcium 10.2 8.5 - 10.5 mg/dL    AST(SGOT) 27 12 - 45 U/L    ALT(SGPT) 23 2 - 50 U/L    Alkaline Phosphatase 63 30 - 99 U/L    Total Bilirubin 1.5 0.1 - 1.5 mg/dL    Albumin 5.2 (H) 3.2 - 4.9 g/dL    Total Protein 8.6 (H) 6.0 - 8.2 g/dL    Globulin 3.4 1.9 - 3.5 g/dL    A-G Ratio 1.5 g/dL   LIPASE    Collection Time: 01/18/20  4:26 PM   Result Value Ref Range    Lipase 15 11 - 82 U/L   APTT    Collection Time: 01/18/20  4:26 PM   Result Value Ref Range    APTT 27.2 24.7 - 36.0 sec   PROTHROMBIN TIME (INR)    Collection Time: 01/18/20  4:26 PM   Result Value Ref Range    PT 13.8 12.0 - 14.6 sec    INR 1.04 0.87 - 1.13    ESTIMATED GFR    Collection Time: 01/18/20  4:26 PM   Result Value Ref Range    GFR If African American >60 >60 mL/min/1.73 m 2    GFR If Non African American >60 >60 mL/min/1.73 m 2   COD (ADULT)    Collection Time: 01/18/20  5:22 PM   Result Value Ref Range    ABO Grouping Only O     Rh Grouping Only POS     Antibody Screen-Cod NEG    HGB    Collection Time: 01/18/20 10:35 PM   Result Value Ref Range    Hemoglobin 12.6 (L) 14.0 - 18.0 g/dL   CBC WITH DIFFERENTIAL    Collection Time: 01/19/20  4:42 AM   Result Value Ref Range    WBC 9.3 4.8 - 10.8 K/uL    RBC 4.34 (L) 4.70 - 6.10 M/uL    Hemoglobin 12.6 (L) 14.0 - 18.0 g/dL    Hematocrit 36.4 (L) 42.0 - 52.0 %    MCV 83.9 81.4 - 97.8 fL    MCH 29.0 27.0 - 33.0 pg    MCHC 34.6 33.7 - 35.3 g/dL    RDW 35.8 (L) 35.9 - 50.0 fL    Platelet Count 151 (L) 164 - 446 K/uL    MPV 10.6 9.0 - 12.9 fL    Neutrophils-Polys 79.80 (H) 44.00 - 72.00 %    Lymphocytes 12.60 (L) 22.00 - 41.00 %    Monocytes 6.60 0.00 - 13.40 %    Eosinophils 0.60 0.00 - 6.90 %    Basophils 0.20 0.00 - 1.80 %    Immature Granulocytes 0.20 0.00 - 0.90 %    Nucleated RBC 0.00 /100 WBC    Neutrophils (Absolute) 7.43 (H) 1.82 - 7.42 K/uL    Lymphs (Absolute) 1.17 1.00 - 4.80 K/uL    Monos (Absolute) 0.61 0.00 - 0.85 K/uL    Eos (Absolute) 0.06 0.00 - 0.51 K/uL    Baso (Absolute) 0.02 0.00 - 0.12 K/uL    Immature Granulocytes (abs) 0.02 0.00 - 0.11 K/uL    NRBC (Absolute) 0.00 K/uL   Comp Metabolic Panel    Collection Time: 01/19/20  4:42 AM   Result Value Ref Range    Sodium 140 135 - 145 mmol/L    Potassium 3.4 (L) 3.6 - 5.5 mmol/L    Chloride 107 96 - 112 mmol/L    Co2 25 20 - 33 mmol/L    Anion Gap 8.0 0.0 - 11.9    Glucose 96 65 - 99 mg/dL    Bun 11 8 - 22 mg/dL    Creatinine 0.83 0.50 - 1.40 mg/dL    Calcium 9.2 8.5 - 10.5 mg/dL    AST(SGOT) 28 12 - 45 U/L    ALT(SGPT) 15 2 - 50 U/L    Alkaline Phosphatase 51 30 - 99 U/L    Total Bilirubin 1.3 0.1 - 1.5 mg/dL    Albumin 4.0 3.2 - 4.9 g/dL    Total  Protein 6.9 6.0 - 8.2 g/dL    Globulin 2.9 1.9 - 3.5 g/dL    A-G Ratio 1.4 g/dL   ACCU-CHEK GLUCOSE    Collection Time: 01/19/20  4:42 AM   Result Value Ref Range    Glucose - Accu-Ck 91 65 - 99 mg/dL   ESTIMATED GFR    Collection Time: 01/19/20  4:42 AM   Result Value Ref Range    GFR If African American >60 >60 mL/min/1.73 m 2    GFR If Non African American >60 >60 mL/min/1.73 m 2   HEMOGLOBIN AND HEMATOCRIT    Collection Time: 01/19/20 11:01 AM   Result Value Ref Range    Hemoglobin 13.1 (L) 14.0 - 18.0 g/dL    Hematocrit 38.4 (L) 42.0 - 52.0 %       Fluids    Intake/Output Summary (Last 24 hours) at 1/19/2020 1214  Last data filed at 1/19/2020 1000  Gross per 24 hour   Intake 1600 ml   Output 1520 ml   Net 80 ml       Core Measures & Quality Metrics  Labs reviewed, Medications reviewed and Radiology images reviewed  Penaloza catheter: No Penaloza      DVT Prophylaxis: Contraindicated - High bleeding risk    Ulcer prophylaxis: Not indicated        RADHA Score  ETOH Screening    Assessment/Plan  Spleen laceration- (present on admission)  Assessment & Plan  Moderate sized perisplenic hematoma  Perisplenic hematoma involving greater than 50% of the surface is a grade 3 splenic injury.  Bedrest times 24 hours.  Serial hemogram and abdominal exams    Contraindication to deep vein thrombosis (DVT) prophylaxis- (present on admission)  Assessment & Plan  Initial systemic anticoagulation contraindicated secondary to elevated bleeding risk.   Consider surveillance venous duplex scanning if unable to initiate prophylactic Lovenox within 48 hrs of admission.    Trauma- (present on admission)  Assessment & Plan  Snowboarding crash. (-) LOC  Trauma Green Activation.  Anderson Romero MD. Trauma Surgery.    Renal cyst- (present on admission)  Assessment & Plan  Incidental findings.  Follow up with PCP outpatient        Discussed patient condition with RN, RT, Pharmacy and Patient.

## 2020-01-19 NOTE — ASSESSMENT & PLAN NOTE
Moderate sized perisplenic hematoma  Perisplenic hematoma involving greater than 50% of the surface is a grade 3 splenic injury.  Bedrest times 24 hours.  Serial hemogram and abdominal exams

## 2020-01-20 VITALS
HEIGHT: 71 IN | BODY MASS INDEX: 29.07 KG/M2 | OXYGEN SATURATION: 97 % | RESPIRATION RATE: 18 BRPM | SYSTOLIC BLOOD PRESSURE: 132 MMHG | TEMPERATURE: 99.1 F | DIASTOLIC BLOOD PRESSURE: 73 MMHG | WEIGHT: 207.67 LBS | HEART RATE: 104 BPM

## 2020-01-20 LAB
ALBUMIN SERPL BCP-MCNC: 3.9 G/DL (ref 3.2–4.9)
ALBUMIN/GLOB SERPL: 1.3 G/DL
ALP SERPL-CCNC: 54 U/L (ref 30–99)
ALT SERPL-CCNC: 18 U/L (ref 2–50)
ANION GAP SERPL CALC-SCNC: 10 MMOL/L (ref 0–11.9)
AST SERPL-CCNC: 29 U/L (ref 12–45)
BASOPHILS # BLD AUTO: 0.2 % (ref 0–1.8)
BASOPHILS # BLD: 0.02 K/UL (ref 0–0.12)
BILIRUB SERPL-MCNC: 1.2 MG/DL (ref 0.1–1.5)
BUN SERPL-MCNC: 7 MG/DL (ref 8–22)
CALCIUM SERPL-MCNC: 9.1 MG/DL (ref 8.5–10.5)
CHLORIDE SERPL-SCNC: 105 MMOL/L (ref 96–112)
CO2 SERPL-SCNC: 24 MMOL/L (ref 20–33)
CREAT SERPL-MCNC: 0.89 MG/DL (ref 0.5–1.4)
EOSINOPHIL # BLD AUTO: 0.15 K/UL (ref 0–0.51)
EOSINOPHIL NFR BLD: 1.4 % (ref 0–6.9)
ERYTHROCYTE [DISTWIDTH] IN BLOOD BY AUTOMATED COUNT: 36.1 FL (ref 35.9–50)
GLOBULIN SER CALC-MCNC: 3.1 G/DL (ref 1.9–3.5)
GLUCOSE SERPL-MCNC: 94 MG/DL (ref 65–99)
HCT VFR BLD AUTO: 37.5 % (ref 42–52)
HCT VFR BLD AUTO: 38.7 % (ref 42–52)
HGB BLD-MCNC: 13 G/DL (ref 14–18)
HGB BLD-MCNC: 13.2 G/DL (ref 14–18)
IMM GRANULOCYTES # BLD AUTO: 0.06 K/UL (ref 0–0.11)
IMM GRANULOCYTES NFR BLD AUTO: 0.6 % (ref 0–0.9)
LYMPHOCYTES # BLD AUTO: 1.4 K/UL (ref 1–4.8)
LYMPHOCYTES NFR BLD: 12.9 % (ref 22–41)
MCH RBC QN AUTO: 29.4 PG (ref 27–33)
MCHC RBC AUTO-ENTMCNC: 35.2 G/DL (ref 33.7–35.3)
MCV RBC AUTO: 83.5 FL (ref 81.4–97.8)
MONOCYTES # BLD AUTO: 0.88 K/UL (ref 0–0.85)
MONOCYTES NFR BLD AUTO: 8.1 % (ref 0–13.4)
NEUTROPHILS # BLD AUTO: 8.31 K/UL (ref 1.82–7.42)
NEUTROPHILS NFR BLD: 76.8 % (ref 44–72)
NRBC # BLD AUTO: 0 K/UL
NRBC BLD-RTO: 0 /100 WBC
PLATELET # BLD AUTO: 108 K/UL (ref 164–446)
PMV BLD AUTO: 11.1 FL (ref 9–12.9)
POTASSIUM SERPL-SCNC: 3.7 MMOL/L (ref 3.6–5.5)
PROT SERPL-MCNC: 7 G/DL (ref 6–8.2)
RBC # BLD AUTO: 4.49 M/UL (ref 4.7–6.1)
SODIUM SERPL-SCNC: 139 MMOL/L (ref 135–145)
WBC # BLD AUTO: 10.8 K/UL (ref 4.8–10.8)

## 2020-01-20 PROCEDURE — 700105 HCHG RX REV CODE 258: Performed by: SURGERY

## 2020-01-20 PROCEDURE — 85025 COMPLETE CBC W/AUTO DIFF WBC: CPT

## 2020-01-20 PROCEDURE — 85014 HEMATOCRIT: CPT

## 2020-01-20 PROCEDURE — 85018 HEMOGLOBIN: CPT

## 2020-01-20 PROCEDURE — 99231 SBSQ HOSP IP/OBS SF/LOW 25: CPT | Performed by: SURGERY

## 2020-01-20 PROCEDURE — 80053 COMPREHEN METABOLIC PANEL: CPT

## 2020-01-20 RX ORDER — ACETAMINOPHEN 325 MG/1
650 TABLET ORAL EVERY 4 HOURS PRN
Qty: 30 TAB | Refills: 0 | Status: SHIPPED | OUTPATIENT
Start: 2020-01-20

## 2020-01-20 RX ADMIN — SODIUM CHLORIDE: 9 INJECTION, SOLUTION INTRAVENOUS at 01:17

## 2020-01-20 ASSESSMENT — ENCOUNTER SYMPTOMS
RESPIRATORY NEGATIVE: 1
MUSCULOSKELETAL NEGATIVE: 1
ABDOMINAL PAIN: 1
PSYCHIATRIC NEGATIVE: 1
CONSTITUTIONAL NEGATIVE: 1
NEUROLOGICAL NEGATIVE: 1

## 2020-01-20 NOTE — PROGRESS NOTES
Trauma / Surgical Daily Progress Note    Date of Service  1/20/2020    Chief Complaint  25 y.o. male admitted 1/18/2020 as a trauma green - snowboarding accident - Grade 3 spleen    Interval Events  Hgb stable   Tolerating diet  Adequate pain control  Ambulated unit with APRN    Transfer to booker versus home this afternoon    Review of Systems  Review of Systems   Constitutional: Negative.    HENT: Negative.    Respiratory: Negative.    Gastrointestinal: Positive for abdominal pain.   Genitourinary: Negative.    Musculoskeletal: Negative.    Neurological: Negative.    Psychiatric/Behavioral: Negative.    All other systems reviewed and are negative.       Vital Signs  Temp:  [36.8 °C (98.2 °F)-38 °C (100.4 °F)] 37.7 °C (99.9 °F)  Pulse:  [] 100  Resp:  [15-32] 27  BP: (115-138)/(57-86) 123/74  SpO2:  [89 %-100 %] 95 %    Physical Exam  Physical Exam  Vitals signs and nursing note reviewed.   Constitutional:       Appearance: He is normal weight.   HENT:      Head: Atraumatic.   Pulmonary:      Effort: Pulmonary effort is normal. No respiratory distress.   Abdominal:      Palpations: Abdomen is soft.      Tenderness: There is tenderness (minimal LUQ tenderness ).   Musculoskeletal: Normal range of motion.   Skin:     General: Skin is warm and dry.      Capillary Refill: Capillary refill takes less than 2 seconds.   Neurological:      Mental Status: He is alert.      GCS: GCS eye subscore is 4. GCS verbal subscore is 5. GCS motor subscore is 6.   Psychiatric:         Mood and Affect: Mood normal.         Laboratory  Recent Results (from the past 24 hour(s))   HEMOGLOBIN AND HEMATOCRIT    Collection Time: 01/19/20 11:01 AM   Result Value Ref Range    Hemoglobin 13.1 (L) 14.0 - 18.0 g/dL    Hematocrit 38.4 (L) 42.0 - 52.0 %   HEMOGLOBIN AND HEMATOCRIT    Collection Time: 01/19/20  6:00 PM   Result Value Ref Range    Hemoglobin 13.0 (L) 14.0 - 18.0 g/dL    Hematocrit 38.2 (L) 42.0 - 52.0 %   HEMOGLOBIN AND HEMATOCRIT     Collection Time: 01/20/20 12:01 AM   Result Value Ref Range    Hemoglobin 13.0 (L) 14.0 - 18.0 g/dL    Hematocrit 38.7 (L) 42.0 - 52.0 %   CBC WITH DIFFERENTIAL    Collection Time: 01/20/20  5:20 AM   Result Value Ref Range    WBC 10.8 4.8 - 10.8 K/uL    RBC 4.49 (L) 4.70 - 6.10 M/uL    Hemoglobin 13.2 (L) 14.0 - 18.0 g/dL    Hematocrit 37.5 (L) 42.0 - 52.0 %    MCV 83.5 81.4 - 97.8 fL    MCH 29.4 27.0 - 33.0 pg    MCHC 35.2 33.7 - 35.3 g/dL    RDW 36.1 35.9 - 50.0 fL    Platelet Count 108 (L) 164 - 446 K/uL    MPV 11.1 9.0 - 12.9 fL    Neutrophils-Polys 76.80 (H) 44.00 - 72.00 %    Lymphocytes 12.90 (L) 22.00 - 41.00 %    Monocytes 8.10 0.00 - 13.40 %    Eosinophils 1.40 0.00 - 6.90 %    Basophils 0.20 0.00 - 1.80 %    Immature Granulocytes 0.60 0.00 - 0.90 %    Nucleated RBC 0.00 /100 WBC    Neutrophils (Absolute) 8.31 (H) 1.82 - 7.42 K/uL    Lymphs (Absolute) 1.40 1.00 - 4.80 K/uL    Monos (Absolute) 0.88 (H) 0.00 - 0.85 K/uL    Eos (Absolute) 0.15 0.00 - 0.51 K/uL    Baso (Absolute) 0.02 0.00 - 0.12 K/uL    Immature Granulocytes (abs) 0.06 0.00 - 0.11 K/uL    NRBC (Absolute) 0.00 K/uL   Comp Metabolic Panel    Collection Time: 01/20/20  5:20 AM   Result Value Ref Range    Sodium 139 135 - 145 mmol/L    Potassium 3.7 3.6 - 5.5 mmol/L    Chloride 105 96 - 112 mmol/L    Co2 24 20 - 33 mmol/L    Anion Gap 10.0 0.0 - 11.9    Glucose 94 65 - 99 mg/dL    Bun 7 (L) 8 - 22 mg/dL    Creatinine 0.89 0.50 - 1.40 mg/dL    Calcium 9.1 8.5 - 10.5 mg/dL    AST(SGOT) 29 12 - 45 U/L    ALT(SGPT) 18 2 - 50 U/L    Alkaline Phosphatase 54 30 - 99 U/L    Total Bilirubin 1.2 0.1 - 1.5 mg/dL    Albumin 3.9 3.2 - 4.9 g/dL    Total Protein 7.0 6.0 - 8.2 g/dL    Globulin 3.1 1.9 - 3.5 g/dL    A-G Ratio 1.3 g/dL   ESTIMATED GFR    Collection Time: 01/20/20  5:20 AM   Result Value Ref Range    GFR If African American >60 >60 mL/min/1.73 m 2    GFR If Non African American >60 >60 mL/min/1.73 m 2       Fluids    Intake/Output Summary (Last  24 hours) at 1/20/2020 0840  Last data filed at 1/20/2020 0600  Gross per 24 hour   Intake 3100 ml   Output 2500 ml   Net 600 ml       Core Measures & Quality Metrics  Labs reviewed and Medications reviewed  Penaloza catheter: No Penaloza      DVT Prophylaxis: Contraindicated - High bleeding risk    Ulcer prophylaxis: Not indicated    Assessed for rehab: Patient returned to prior level of function, rehabilitation not indicated at this time    RAP Score Total: 2    ETOH Screening  CAGE Score: 1  Assessment complete date: 1/19/2020        Assessment/Plan  Spleen laceration- (present on admission)  Assessment & Plan  Moderate sized perisplenic hematoma  Perisplenic hematoma involving greater than 50% of the surface is a grade 3 splenic injury.  Bedrest times 24 hours.  Serial hemogram and abdominal exams    Contraindication to deep vein thrombosis (DVT) prophylaxis- (present on admission)  Assessment & Plan  Initial systemic anticoagulation contraindicated secondary to elevated bleeding risk.   Consider surveillance venous duplex scanning if unable to initiate prophylactic Lovenox within 48 hrs of admission.    Trauma- (present on admission)  Assessment & Plan  Snowboarding crash. (-) LOC  Trauma Green Activation.  Anderson Romero MD. Trauma Surgery.    Renal cyst- (present on admission)  Assessment & Plan  Incidental findings.  Follow up with PCP outpatient    Discussed patient condition with RN, Patient and trauma surgery. Dr. Parks     I saw and evaluated the patient and discussed his management with the trauma APRN, Ruthy Tran. I reviewed the APRNs note and agree with the documented findings and plan of care. On exam his abdomen is soft and non-tender. He is appropriate for discharge home.    Karthik Parks MD

## 2020-01-20 NOTE — DISCHARGE INSTRUCTIONS
Discharge Instructions    Discharged to home by car with relative. Discharged via wheelchair, hospital escort: Refused.  Special equipment needed: Not Applicable    Be sure to schedule a follow-up appointment with your primary care doctor or any specialists as instructed.     Discharge Plan:   Diet Plan: Discussed  Activity Level: Discussed  Confirmed Follow up Appointment: Patient to Call and Schedule Appointment  Confirmed Symptoms Management: Discussed  Medication Reconciliation Updated: Yes  Influenza Vaccine Indication: Indicated: 9 to 64 years of age  Influenza Vaccine Given - only chart on this line when given: Influenza Vaccine Given (See MAR)    I understand that a diet low in cholesterol, fat, and sodium is recommended for good health. Unless I have been given specific instructions below for another diet, I accept this instruction as my diet prescription.   Other diet: REgular    Special Instructions: None    · Is patient discharged on Warfarin / Coumadin?   No     Depression / Suicide Risk    As you are discharged from this [facility] facility, it is important to learn how to keep safe from harming yourself.    Recognize the warning signs:  · Abrupt changes in personality, positive or negative- including increase in energy   · Giving away possessions  · Change in eating patterns- significant weight changes-  positive or negative  · Change in sleeping patterns- unable to sleep or sleeping all the time   · Unwillingness or inability to communicate  · Depression  · Unusual sadness, discouragement and loneliness  · Talk of wanting to die  · Neglect of personal appearance   · Rebelliousness- reckless behavior  · Withdrawal from people/activities they love  · Confusion- inability to concentrate     If you or a loved one observes any of these behaviors or has concerns about self-harm, here's what you can do:  · Talk about it- your feelings and reasons for harming yourself  · Remove any means that you might  use to hurt yourself (examples: pills, rope, extension cords, firearm)  · Get professional help from the community (Mental Health, Substance Abuse, psychological counseling)  · Do not be alone:Call your Safe Contact- someone whom you trust who will be there for you.  · Call your local CRISIS HOTLINE 565-2898 or 145-776-6185  · Call your local Children's Mobile Crisis Response Team [place] (730) 007-8559 or wwwRedDrummer  · Call the toll free National Suicide Prevention Hotlines   · National Suicide Prevention Lifeline 758-918-QTFO (8191)  · [employer] 800-SUICIDE (345-4297)    Splenic Injury  A splenic injury is an injury of the spleen. The spleen is an organ located in the upper left area of your abdomen, just under your ribs. Your spleen filters and cleans your blood. It also stores blood cells and destroys cells that are worn out. Your spleen is also important for fighting disease.  Splenic injuries can vary. In some cases, the spleen may only be bruised with some bleeding inside the covering and around the spleen. Splenic injuries may also cause a deep tear or cut into the spleen (lacerated spleen). Some splenic injuries can cause the spleen to break open (rupture).  What are the causes?  Splenic injuries can be caused by a direct blow (blunt trauma) from:  · Car accidents.  · Contact sports.  · Falls.  Gunshot wounds or knife wounds (penetrating injuries) can also cause a splenic injury.  What increases the risk?  You may be at greater risk for a splenic injury if you have a disease that can cause the spleen to become enlarged. These include:  · Alcoholic liver disease.  · Viral infections, especially mononucleosis.  What are the signs or symptoms?  A minor splenic injury often causes no symptoms or only minor abdominal pain. If the injury causes severe bleeding, your blood pressure may rapidly decrease. This may cause:  · Dizziness or light-headedness.  · Rapid heart  rate.  · Difficulty breathing.  · Fainting.  · Sweating with clammy skin.  Other signs and symptoms of a splenic injury can include:  · Very bad abdominal pain.  · Pain in the left shoulder.  · Pain when the abdomen is pressed (tenderness).  · Nausea.  · Swelling or bruising of the abdomen.  How is this diagnosed?  Your health care provider may suspect a splenic injury based on your signs and symptoms, especially if you were recently in an accident or you recently got hurt. Your health care provider will do a physical exam. Imaging tests may be done to confirm the diagnosis. These may include:  · Ultrasound.  · CT scan.  You may have frequent blood tests for a few days after the injury to monitor your condition.  How is this treated?  Treatment depends on the type of splenic injury you have and how bad it is. Your health care provider will develop a treatment plan specific to your needs.  · Less severe injuries may be treated with:  ¨ Observation.  ¨ Interventional radiology. This involves using flexible tubes (catheters) to stop the bleeding from inside the blood vessel.  · More severe injuries may require hospitalization in the intensive care unit (ICU). While you are in the ICU:  ¨ Your fluid and blood levels will be monitored closely.  ¨ You will get fluids through an IV tube as needed.  ¨ You may need follow-up scans to check whether your spleen is able to heal itself. If the injury is getting worse, you may need surgery.  ¨ You may receive donated blood (transfusion).  ¨ You may have a long needle inserted into your abdomen to remove any blood that has collected inside the spleen (hematoma).  · Surgery. If your blood pressure is too low, you may need emergency surgery. This may include:  ¨ Repairing a laceration.  ¨ Removing part of the spleen.  ¨ Removing the entire spleen (splenectomy).  Follow these instructions at home:  · Take medicines only as directed by your health care provider.  · Rest at home.  · Do  not participate in any strenuous activity until your health care provider says it is safe to do so.  · Do not lift anything that is heavier than 10 lb (4.5 kg).  · Do not participate in contact sports until your health care provider says it is safe to do so.  · Stay up-to-date on vaccinations as told by your health care provider.  Contact a health care provider if:  · You have a fever.  · You have new or increasing pain in your abdomen or in your left shoulder.  Get help right away if:  · You have signs or symptoms of internal bleeding. Watch for:  ¨ Sweating.  ¨ Dizziness.  ¨ Weakness.  ¨ Cold and clammy skin.  ¨ Fainting.  · You have chest pain or difficulty breathing.  This information is not intended to replace advice given to you by your health care provider. Make sure you discuss any questions you have with your health care provider.  Document Released: 10/09/2007 Document Revised: 08/15/2017 Document Reviewed: 09/02/2015  [employer] Interactive Patient Education © 2017 [employer]    - Call or seek medical attention for questions or concerns  - Follow up with Dr. Romero if needed in the [place] area  - Avoid all blood thinners including aspirin or NSAIDs (ibuprophen, Advil, Aleve, Motrin) for at least two weeks  - Follow up with primary care provider within one weeks time  - Resume regular diet  - May take over the counter acetaminophen  as needed for pain  - Continue daily over the counter stool softener while on narcotics  - No operation of machinery or motorized vehicles while under the influence of narcotics  - No alcohol, marijuana or illicit drug use while under the influence of narcotics  - No swimming, hot tubs, baths or wound submersion until cleared by outpatient provider. May shower  - No contact sports, strenuous activities, or heavy lifting until cleared by outpatient provider  - If respiratory decompensation, change in condition or worsening condition, or signs or symptoms of infection occur seek  medical attention

## 2020-01-20 NOTE — PROGRESS NOTES
Patient discharged home with family and friends via wheelchair. All discharge instructions reviewed and discussed. All questions answered. PIVs removed. Belongings given to patient.

## 2020-01-20 NOTE — CARE PLAN
Problem: Venous Thromboembolism (VTW)/Deep Vein Thrombosis (DVT) Prevention:  Goal: Patient will participate in Venous Thrombosis (VTE)/Deep Vein Thrombosis (DVT)Prevention Measures  Intervention: Ensure patient wears graduated elastic stockings (HENRI hose) and/or SCDs, if ordered, when in bed or chair (Remove at least once per shift for skin check)  Flowsheets (Taken 1/19/2020 0692)  Mechanical Prophylaxis : SCDs, Sequential Compression Device  SCDs, Sequential Compression Device: On     Problem: Pain Management  Goal: Pain level will decrease to patient's comfort goal  Intervention: Follow pain managment plan developed in collaboration with patient and Interdisciplinary Team  Note:   Pain assessed q2 hours

## 2020-01-20 NOTE — DISCHARGE PLANNING
Per JACBO, pt may dc home. No needs at this time. Pt lives in [place], [place]. Girlfriend is flying into [place] today and will drive pt home. LSW will continue to follow and assist if needs arise.     Care Transition Team Assessment    Information Source  Orientation : Oriented x 4  Information Given By: Patient  Informant's Name: Carlos    Elopement Risk  Legal Hold: No  Ambulatory or Self Mobile in Wheelchair: No-Not an Elopement Risk  Disoriented: No  Psychiatric Symptoms: None  History of Wandering: No  Elopement this Admit: No  Vocalizing Wanting to Leave: No  Displays Behaviors, Body Language Wanting to Leave: No-Not at Risk for Elopement  Elopement Risk: Not at Risk for Elopement    Interdisciplinary Discharge Planning  Patient or legal guardian wants to designate a caregiver (see row info): No    Discharge Preparedness  What is your plan after discharge?: Home with help  What are your discharge supports?: Other (comment)(Girlfriend and family)    Vision / Hearing Impairment  Vision Impairment : Yes  Right Eye Vision: Impaired, Wears Glasses  Left Eye Vision: Impaired, Wears Glasses  Hearing Impairment : No    Domestic Abuse  Have you ever been the victim of abuse or violence?: No  Physical Abuse or Sexual Abuse: No  Verbal Abuse or Emotional Abuse: No  Possible Abuse Reported to:: Not Applicable    Psychological Assessment  History of Substance Abuse: None  History of Psychiatric Problems: No  Non-compliant with Treatment: No  Newly Diagnosed Illness: No    Discharge Risks or Barriers  Discharge risks or barriers?: No    Anticipated Discharge Information  Anticipated discharge disposition: Home

## 2020-01-20 NOTE — CARE PLAN
Problem: Safety  Goal: Will remain free from falls  1/20/2020 0849 by Chelsie Duncan R.N.  Note:   Education provided on fall preventions. Bed locked and in lowest position. Bed alarm on. Call light within reach.   1/20/2020 0837 by Chelsie Duncan R.N.  Note:   Fall precautions in place      Problem: Knowledge Deficit  Goal: Knowledge of disease process/condition, treatment plan, diagnostic tests, and medications will improve  Note:   Discussion regarding disease process and treatment plan